# Patient Record
Sex: MALE | Race: WHITE | NOT HISPANIC OR LATINO | Employment: STUDENT | URBAN - METROPOLITAN AREA
[De-identification: names, ages, dates, MRNs, and addresses within clinical notes are randomized per-mention and may not be internally consistent; named-entity substitution may affect disease eponyms.]

---

## 2024-03-10 ENCOUNTER — OFFICE VISIT (OUTPATIENT)
Dept: URGENT CARE | Facility: CLINIC | Age: 24
End: 2024-03-10
Payer: COMMERCIAL

## 2024-03-10 VITALS
HEART RATE: 77 BPM | DIASTOLIC BLOOD PRESSURE: 77 MMHG | RESPIRATION RATE: 18 BRPM | BODY MASS INDEX: 30.17 KG/M2 | SYSTOLIC BLOOD PRESSURE: 134 MMHG | WEIGHT: 203.69 LBS | TEMPERATURE: 98 F | OXYGEN SATURATION: 98 % | HEIGHT: 69 IN

## 2024-03-10 DIAGNOSIS — H92.01 RIGHT EAR PAIN: ICD-10-CM

## 2024-03-10 DIAGNOSIS — H61.23 BILATERAL IMPACTED CERUMEN: ICD-10-CM

## 2024-03-10 DIAGNOSIS — H60.501 ACUTE OTITIS EXTERNA OF RIGHT EAR, UNSPECIFIED TYPE: Primary | ICD-10-CM

## 2024-03-10 PROCEDURE — 99204 OFFICE O/P NEW MOD 45 MIN: CPT | Mod: S$GLB,,, | Performed by: NURSE PRACTITIONER

## 2024-03-10 RX ORDER — CIPROFLOXACIN AND DEXAMETHASONE 3; 1 MG/ML; MG/ML
4 SUSPENSION/ DROPS AURICULAR (OTIC) 2 TIMES DAILY
Qty: 7.5 ML | Refills: 0 | Status: SHIPPED | OUTPATIENT
Start: 2024-03-10 | End: 2024-03-17

## 2024-03-10 NOTE — PATIENT INSTRUCTIONS
Ear hygiene   Debrox ear wax removal kit OTC as directed  AVOID QTIPS  Apply Ciprodex ear drops to right ear as directed  Tylenol every 6 hours as needed for pain/inflammation  Alternate with Ibuprofen every 6 hours as needed for pain/inflammation  Follow up as needed/with worsening

## 2024-03-10 NOTE — PROGRESS NOTES
"Subjective:      Patient ID: Umer Clark is a 23 y.o. male.    Vitals:  height is 5' 9" (1.753 m) and weight is 92.4 kg (203 lb 11.3 oz). His tympanic temperature is 98.2 °F (36.8 °C). His blood pressure is 134/77 and his pulse is 77. His respiration is 18 and oxygen saturation is 98%.     Chief Complaint: Ear Fullness    23 Year old male presents for evaluation of right ear fullness x 2 days. Attempted to clean by applying peroxide and using a "." Also reports attempt to remove wax with fingernail. States that he was able to get some wax out but it did not provide relief.     Ear Fullness   There is pain in the right ear. This is a new problem. The current episode started yesterday. The problem occurs constantly. The problem has been unchanged. There has been no fever. The pain is at a severity of 1/10. The pain is mild. Pertinent negatives include no ear discharge. Treatments tried: Patient stated he took allergy medicine.       Constitution: Negative.   HENT:  Positive for ear pain. Negative for ear discharge.    Neck: neck negative.   Cardiovascular: Negative.    Eyes: Negative.    Respiratory: Negative.     Gastrointestinal: Negative.    Endocrine: negative.   Genitourinary: Negative.    Musculoskeletal: Negative.    Skin: Negative.    Allergic/Immunologic: Negative.    Neurological: Negative.    Hematologic/Lymphatic: Negative.    Psychiatric/Behavioral: Negative.        Objective:     Physical Exam   Constitutional: He is oriented to person, place, and time. He is cooperative.  Non-toxic appearance. He does not appear ill. No distress. normalawake  HENT:   Head: Normocephalic and atraumatic.   Ears:   Right Ear: There is swelling, tenderness and cerumen present (partial). Tympanic membrane is not injected, not scarred, not perforated, not erythematous and not retracted.   Left Ear: There is cerumen present (complete). No no drainage, swelling or tenderness. Tympanic membrane is not injected, not " scarred, not perforated, not erythematous and not retracted. impacted cerumen  Eyes: Conjunctivae are normal. Pupils are equal, round, and reactive to light. Right eye exhibits no discharge. Left eye exhibits no discharge. No scleral icterus. Extraocular movement intact   Neck: Neck supple.   Cardiovascular: Normal rate.   Pulmonary/Chest: Effort normal.   Abdominal: Normal appearance.   Musculoskeletal: Normal range of motion.         General: Normal range of motion.   Neurological: no focal deficit. He is alert and oriented to person, place, and time.   Skin: Skin is warm, dry and not diaphoretic.   Psychiatric: His behavior is normal. Mood, judgment and thought content normal.   Nursing note and vitals reviewed.      Assessment:     1. Acute otitis externa of right ear, unspecified type    2. Right ear pain    3. Bilateral impacted cerumen        Plan:       Acute otitis externa of right ear, unspecified type  -     ciprofloxacin-dexAMETHasone 0.3-0.1% (CIPRODEX) 0.3-0.1 % DrpS; Place 4 drops into the right ear 2 (two) times daily. for 7 days  Dispense: 7.5 mL; Refill: 0    Right ear pain    Bilateral impacted cerumen  -     Ear wax removal        Patient presents for evaluation of right ear pain and fullness. Exam reveals bilateral cerumen impaction and right otitis externa. Decision to perform ear wash to provide relief of cerumen impaction. Plan is to treat bacterial infection, manage symptoms, manage ear hygiene at home, prevent worsening, and prevent re-occurrence. Discussed with patient who verbalizes understanding.         Patient Instructions   Ear hygiene   Debrox ear wax removal kit OTC as directed  AVOID QTIPS  Apply Ciprodex ear drops to right ear as directed  Tylenol every 6 hours as needed for pain/inflammation  Alternate with Ibuprofen every 6 hours as needed for pain/inflammation  Follow up as needed/with worsening

## 2024-03-17 ENCOUNTER — OFFICE VISIT (OUTPATIENT)
Dept: URGENT CARE | Facility: CLINIC | Age: 24
End: 2024-03-17
Payer: COMMERCIAL

## 2024-03-17 VITALS
BODY MASS INDEX: 30.07 KG/M2 | DIASTOLIC BLOOD PRESSURE: 91 MMHG | TEMPERATURE: 97 F | RESPIRATION RATE: 14 BRPM | OXYGEN SATURATION: 96 % | HEART RATE: 70 BPM | HEIGHT: 69 IN | WEIGHT: 203 LBS | SYSTOLIC BLOOD PRESSURE: 135 MMHG

## 2024-03-17 DIAGNOSIS — H60.501 ACUTE OTITIS EXTERNA OF RIGHT EAR, UNSPECIFIED TYPE: ICD-10-CM

## 2024-03-17 DIAGNOSIS — Z01.118 ABNORMAL OTOSCOPIC EXAM OF RIGHT EAR: ICD-10-CM

## 2024-03-17 DIAGNOSIS — G51.0 BELL'S PALSY: Primary | ICD-10-CM

## 2024-03-17 PROCEDURE — 99214 OFFICE O/P EST MOD 30 MIN: CPT | Mod: S$GLB,,,

## 2024-03-17 RX ORDER — METHYLPREDNISOLONE 4 MG/1
TABLET ORAL
COMMUNITY
Start: 2024-03-14 | End: 2024-03-17 | Stop reason: ALTCHOICE

## 2024-03-17 RX ORDER — PREDNISONE 20 MG/1
80 TABLET ORAL DAILY
Qty: 20 TABLET | Refills: 0 | Status: SHIPPED | OUTPATIENT
Start: 2024-03-17 | End: 2024-03-22

## 2024-03-17 RX ORDER — VALACYCLOVIR HYDROCHLORIDE 1 G/1
1000 TABLET, FILM COATED ORAL 3 TIMES DAILY
Qty: 21 TABLET | Refills: 0 | Status: SHIPPED | OUTPATIENT
Start: 2024-03-17 | End: 2024-03-24

## 2024-03-17 NOTE — LETTER
March 17, 2024      [facility] [address] [place] [address]  Phone: [phone]  Fax: [phone]       Patient: Umer Clark   YOB: 2000  Date of Visit: 03/17/2024    To Whom It May Concern:    Yaw Clark  was at [facility] on 03/17/2024. The patient may return to work/school on 03/19/2024 with no restrictions. If you have any questions or concerns, or if I can be of further assistance, please do not hesitate to contact me.    Sincerely,    Litzy Morrow PA-C

## 2024-03-17 NOTE — PROGRESS NOTES
"Subjective:      Patient ID: Umer Clark is a 23 y.o. male.    Vitals:  height is 5' 9" (1.753 m) and weight is 92.1 kg (203 lb). His tympanic temperature is 97.4 °F (36.3 °C). His blood pressure is 135/91 (abnormal) and his pulse is 70. His respiration is 14 and oxygen saturation is 96%.     Chief Complaint: Facial Swelling    Umer Clark is a 23 y.o. male who presents for R facial stiffness which onset 4 days ago. Patient was seen in clinic on 3/10 with bilateral cerumen impaction and R otitis externa. He was given Ciprodex. Symptoms onset a few days after using the drops. He states ear symptoms have improved. Patient is currently taking medication prescribed by his doctor for the facial stiffness. Associated sxs include tingling. No pain. Patient denies any fever, chills, SOB, CP, abd pain, n/v/d, rash, HA, vision changes, dizziness. Denies neck pain. No hearing loss.     Other  This is a new problem. Pertinent negatives include no abdominal pain, chest pain, chills, diaphoresis, fatigue, fever, headaches, nausea, numbness, vomiting or weakness.       Constitution: Negative for chills, sweating, fatigue and fever.   HENT:  Negative for ear pain, ear discharge, tinnitus and hearing loss.         (+) R facial stiffness   Cardiovascular:  Negative for chest pain and palpitations.   Respiratory:  Negative for shortness of breath.    Gastrointestinal:  Negative for abdominal pain, nausea, vomiting and diarrhea.   Neurological:  Positive for tingling. Negative for dizziness, headaches and numbness.      Objective:     Physical Exam   Constitutional: He is oriented to person, place, and time. He appears well-developed. He is cooperative.  Non-toxic appearance. He does not appear ill. No distress.   HENT:   Head: Normocephalic and atraumatic.   Ears:   Right Ear: Hearing and external ear normal. There is swelling. No no drainage. No mastoid tenderness. Tympanic membrane is not perforated, not erythematous and not " bulging. No decreased hearing is noted.   Left Ear: Hearing, tympanic membrane, external ear and ear canal normal.   Nose: Nose normal. No mucosal edema or nasal deformity. No epistaxis. Right sinus exhibits no maxillary sinus tenderness and no frontal sinus tenderness. Left sinus exhibits no maxillary sinus tenderness and no frontal sinus tenderness.   Mouth/Throat: Uvula is midline, oropharynx is clear and moist and mucous membranes are normal. Mucous membranes are moist. No trismus in the jaw. Normal dentition. No uvula swelling. No oropharyngeal exudate, posterior oropharyngeal edema or posterior oropharyngeal erythema.   Drooping of R face. Unable to close R eye. Watery. Sensation intact. Appears to have multiple vesicles on R TM. No perforation. Canal erythema and mild swelling. No drainage. No vesicles apparent in canal or auricle. No mastoid tenderness.       Comments: Drooping of R face. Unable to close R eye. Watery. Sensation intact. Appears to have multiple vesicles on R TM. No perforation. Canal erythema and mild swelling. No drainage. No vesicles apparent in canal or auricle. No mastoid tenderness.   Eyes: Conjunctivae and lids are normal. No scleral icterus. Extraocular movement intact   Neck: Trachea normal and phonation normal. Neck supple. No edema present. No erythema present. No neck rigidity present.   Cardiovascular: Normal rate, regular rhythm, normal heart sounds and normal pulses.   Pulmonary/Chest: Effort normal and breath sounds normal. No stridor. No respiratory distress. He has no decreased breath sounds. He has no wheezes. He has no rhonchi. He has no rales.   Abdominal: Normal appearance.   Musculoskeletal: Normal range of motion.         General: No deformity. Normal range of motion.   Neurological: He is alert and oriented to person, place, and time. He has normal motor skills. He displays facial asymmetry. He displays no weakness and no dysarthria. A cranial nerve deficit is  present. He exhibits normal muscle tone. He has a normal Finger-Nose-Finger Test and a normal Tandem Gait Test. Coordination: Romberg sign negative. He shows no pronator drift. Gait normal. Coordination and gait normal.      Comments: Facial asymmetry of R face. Drooping. Unable to lift R forehead. Sensation intact. No dysarthria. Negative pronator drift. Negative finger to nose. Negative heel to shin. Strength 5/5 to BUE and BLE. Negative Romberg. Gait intact.    Skin: Skin is warm, dry, intact, not diaphoretic and not pale.   Psychiatric: His speech is normal and behavior is normal. Judgment and thought content normal.   Nursing note and vitals reviewed.      Assessment:     1. Bell's palsy    2. Acute otitis externa of right ear, unspecified type    3. Abnormal otoscopic exam of right ear        Plan:       Bell's palsy  -     valACYclovir (VALTREX) 1000 MG tablet; Take 1 tablet (1,000 mg total) by mouth 3 (three) times daily. for 7 days  Dispense: 21 tablet; Refill: 0  -     predniSONE (DELTASONE) 20 MG tablet; Take 4 tablets (80 mg total) by mouth once daily. for 5 days  Dispense: 20 tablet; Refill: 0    Acute otitis externa of right ear, unspecified type  -     Ambulatory referral/consult to ENT    Abnormal otoscopic exam of right ear  -     Ambulatory referral/consult to ENT      Chart reviewed.  Afebrile. VSS.  R facial paralysis consistent with Bell's palsy.  Forehead affected. Neurological exam otherwise unremarkable. I have considered stroke but less likely.  There appears to be vesicle on R TM. Will adjunct antiviral tx for HSV/HZV.   DDx: bullous myringitis  Discontinue medrol dose pack. Will convert to prednisone.  Meds: Prednisone and valtrex sent to preferred pharmacy  Referral placed for follow up with ENT if necessary, possible [place] [facility] but suspicion low.  Recommend supportive measures such as artificial tears and taping eyelid shut to prevent corneal injury.  Patient given handout for Bell's  palsy exercises.  RTC for any worsening or no improvement of symptoms as needed.